# Patient Record
Sex: MALE | Race: WHITE | NOT HISPANIC OR LATINO
[De-identification: names, ages, dates, MRNs, and addresses within clinical notes are randomized per-mention and may not be internally consistent; named-entity substitution may affect disease eponyms.]

---

## 2017-07-12 ENCOUNTER — APPOINTMENT (OUTPATIENT)
Dept: ORTHOPEDIC SURGERY | Facility: CLINIC | Age: 52
End: 2017-07-12

## 2017-07-12 DIAGNOSIS — Z86.79 PERSONAL HISTORY OF OTHER DISEASES OF THE CIRCULATORY SYSTEM: ICD-10-CM

## 2017-07-12 DIAGNOSIS — Z78.9 OTHER SPECIFIED HEALTH STATUS: ICD-10-CM

## 2017-07-12 DIAGNOSIS — Z82.3 FAMILY HISTORY OF STROKE: ICD-10-CM

## 2017-07-12 PROBLEM — Z00.00 ENCOUNTER FOR PREVENTIVE HEALTH EXAMINATION: Status: ACTIVE | Noted: 2017-07-12

## 2017-07-12 RX ORDER — HYDROCHLOROTHIAZIDE 12.5 MG/1
TABLET ORAL
Refills: 0 | Status: ACTIVE | COMMUNITY

## 2017-07-12 RX ORDER — ZOLPIDEM TARTRATE 10 MG/1
10 TABLET ORAL
Refills: 0 | Status: ACTIVE | COMMUNITY

## 2017-07-12 RX ORDER — AMLODIPINE BESYLATE 5 MG/1
5 TABLET ORAL
Refills: 0 | Status: ACTIVE | COMMUNITY

## 2017-07-12 RX ORDER — SERTRALINE HYDROCHLORIDE 50 MG/1
50 TABLET, FILM COATED ORAL
Refills: 0 | Status: ACTIVE | COMMUNITY

## 2017-07-13 RX ORDER — CEPHALEXIN 500 MG/1
500 CAPSULE ORAL 4 TIMES DAILY
Qty: 40 | Refills: 0 | Status: ACTIVE | COMMUNITY
Start: 2017-07-13 | End: 1900-01-01

## 2017-07-24 RX ORDER — HYDROCODONE BITARTRATE AND ACETAMINOPHEN 5; 300 MG/1; MG/1
5-300 TABLET ORAL
Qty: 40 | Refills: 0 | Status: ACTIVE | COMMUNITY
Start: 2017-07-24 | End: 1900-01-01

## 2017-07-25 ENCOUNTER — OUTPATIENT (OUTPATIENT)
Dept: OUTPATIENT SERVICES | Facility: HOSPITAL | Age: 52
LOS: 1 days | Discharge: HOME | End: 2017-07-25
Payer: COMMERCIAL

## 2017-07-25 ENCOUNTER — APPOINTMENT (OUTPATIENT)
Dept: ORTHOPEDIC SURGERY | Facility: AMBULATORY SURGERY CENTER | Age: 52
End: 2017-07-25

## 2017-07-25 PROCEDURE — 26356 REPAIR FINGER/HAND TENDON: CPT | Mod: F9
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
## 2017-07-28 DIAGNOSIS — Z88.8 ALLERGY STATUS TO OTHER DRUGS, MEDICAMENTS AND BIOLOGICAL SUBSTANCES STATUS: ICD-10-CM

## 2017-07-28 DIAGNOSIS — I10 ESSENTIAL (PRIMARY) HYPERTENSION: ICD-10-CM

## 2017-07-28 DIAGNOSIS — F32.9 MAJOR DEPRESSIVE DISORDER, SINGLE EPISODE, UNSPECIFIED: ICD-10-CM
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
## 2017-07-28 DIAGNOSIS — S64.496A INJURY OF DIGITAL NERVE OF RIGHT LITTLE FINGER, INITIAL ENCOUNTER: ICD-10-CM

## 2017-08-07 ENCOUNTER — APPOINTMENT (OUTPATIENT)
Dept: ORTHOPEDIC SURGERY | Facility: CLINIC | Age: 52
End: 2017-08-07
Payer: COMMERCIAL

## 2017-08-07 PROCEDURE — 29075 APPL CST ELBW FNGR SHORT ARM: CPT | Mod: 58,RT

## 2017-08-07 PROCEDURE — 99024 POSTOP FOLLOW-UP VISIT: CPT

## 2017-08-28 ENCOUNTER — APPOINTMENT (OUTPATIENT)
Dept: ORTHOPEDIC SURGERY | Facility: CLINIC | Age: 52
End: 2017-08-28
Payer: COMMERCIAL

## 2017-08-28 PROCEDURE — 99024 POSTOP FOLLOW-UP VISIT: CPT

## 2017-09-25 ENCOUNTER — APPOINTMENT (OUTPATIENT)
Dept: ORTHOPEDIC SURGERY | Facility: CLINIC | Age: 52
End: 2017-09-25
Payer: COMMERCIAL

## 2017-09-25 PROCEDURE — 99024 POSTOP FOLLOW-UP VISIT: CPT

## 2017-11-06 ENCOUNTER — APPOINTMENT (OUTPATIENT)
Dept: ORTHOPEDIC SURGERY | Facility: CLINIC | Age: 52
End: 2017-11-06
Payer: COMMERCIAL

## 2017-11-06 VITALS — HEIGHT: 72 IN | WEIGHT: 175 LBS | BODY MASS INDEX: 23.7 KG/M2 | RESPIRATION RATE: 16 BRPM

## 2017-11-06 PROCEDURE — 99024 POSTOP FOLLOW-UP VISIT: CPT

## 2018-01-08 ENCOUNTER — APPOINTMENT (OUTPATIENT)
Dept: ORTHOPEDIC SURGERY | Facility: CLINIC | Age: 53
End: 2018-01-08
Payer: COMMERCIAL

## 2018-01-08 VITALS — WEIGHT: 180 LBS | RESPIRATION RATE: 16 BRPM | HEIGHT: 72 IN | BODY MASS INDEX: 24.38 KG/M2

## 2018-01-08 PROCEDURE — 99214 OFFICE O/P EST MOD 30 MIN: CPT

## 2018-04-30 ENCOUNTER — APPOINTMENT (OUTPATIENT)
Dept: ORTHOPEDIC SURGERY | Facility: CLINIC | Age: 53
End: 2018-04-30

## 2018-05-14 ENCOUNTER — APPOINTMENT (OUTPATIENT)
Dept: ORTHOPEDIC SURGERY | Facility: CLINIC | Age: 53
End: 2018-05-14
Payer: COMMERCIAL

## 2018-05-14 DIAGNOSIS — S64.496A: ICD-10-CM

## 2018-05-14 DIAGNOSIS — S64.40XA INJURY OF DIGITAL NERVE OF UNSPECIFIED FINGER, INITIAL ENCOUNTER: ICD-10-CM

## 2018-05-14 PROCEDURE — 99214 OFFICE O/P EST MOD 30 MIN: CPT

## 2021-03-09 ENCOUNTER — FORM ENCOUNTER (OUTPATIENT)
Age: 56
End: 2021-03-09

## 2021-03-10 ENCOUNTER — TRANSCRIPTION ENCOUNTER (OUTPATIENT)
Age: 56
End: 2021-03-10

## 2021-03-12 ENCOUNTER — APPOINTMENT (OUTPATIENT)
Dept: DISASTER EMERGENCY | Facility: HOSPITAL | Age: 56
End: 2021-03-12

## 2021-03-13 ENCOUNTER — APPOINTMENT (OUTPATIENT)
Dept: DISASTER EMERGENCY | Facility: HOSPITAL | Age: 56
End: 2021-03-13

## 2021-10-23 ENCOUNTER — TRANSCRIPTION ENCOUNTER (OUTPATIENT)
Age: 56
End: 2021-10-23

## 2021-12-16 ENCOUNTER — TRANSCRIPTION ENCOUNTER (OUTPATIENT)
Age: 56
End: 2021-12-16

## 2022-03-10 ENCOUNTER — APPOINTMENT (OUTPATIENT)
Dept: OPHTHALMOLOGY | Facility: CLINIC | Age: 57
End: 2022-03-10
Payer: COMMERCIAL

## 2022-03-10 ENCOUNTER — NON-APPOINTMENT (OUTPATIENT)
Age: 57
End: 2022-03-10

## 2022-03-10 PROCEDURE — 92133 CPTRZD OPH DX IMG PST SGM ON: CPT

## 2022-03-10 PROCEDURE — 76514 ECHO EXAM OF EYE THICKNESS: CPT

## 2022-03-10 PROCEDURE — 92020 GONIOSCOPY: CPT

## 2022-03-10 PROCEDURE — 92002 INTRM OPH EXAM NEW PATIENT: CPT

## 2022-03-10 PROCEDURE — 92083 EXTENDED VISUAL FIELD XM: CPT

## 2022-03-10 PROCEDURE — 76513 OPH US DX ANT SGM US UNI/BI: CPT | Mod: LT

## 2022-04-06 ENCOUNTER — NON-APPOINTMENT (OUTPATIENT)
Age: 57
End: 2022-04-06

## 2022-04-06 ENCOUNTER — APPOINTMENT (OUTPATIENT)
Dept: OPHTHALMOLOGY | Facility: CLINIC | Age: 57
End: 2022-04-06
Payer: COMMERCIAL

## 2022-04-06 PROCEDURE — 92012 INTRM OPH EXAM EST PATIENT: CPT

## 2022-07-27 ENCOUNTER — APPOINTMENT (OUTPATIENT)
Dept: OPHTHALMOLOGY | Facility: CLINIC | Age: 57
End: 2022-07-27

## 2022-07-27 ENCOUNTER — NON-APPOINTMENT (OUTPATIENT)
Age: 57
End: 2022-07-27

## 2022-07-27 PROCEDURE — 76513 OPH US DX ANT SGM US UNI/BI: CPT | Mod: RT

## 2022-07-27 PROCEDURE — 92083 EXTENDED VISUAL FIELD XM: CPT

## 2022-07-27 PROCEDURE — 92014 COMPRE OPH EXAM EST PT 1/>: CPT

## 2022-09-12 ENCOUNTER — NON-APPOINTMENT (OUTPATIENT)
Age: 57
End: 2022-09-12

## 2022-09-12 ENCOUNTER — APPOINTMENT (OUTPATIENT)
Dept: OPHTHALMOLOGY | Facility: CLINIC | Age: 57
End: 2022-09-12

## 2022-09-12 PROCEDURE — 66761 REVISION OF IRIS: CPT | Mod: RT

## 2022-10-10 ENCOUNTER — APPOINTMENT (OUTPATIENT)
Dept: OPHTHALMOLOGY | Facility: CLINIC | Age: 57
End: 2022-10-10

## 2022-10-10 ENCOUNTER — NON-APPOINTMENT (OUTPATIENT)
Age: 57
End: 2022-10-10

## 2022-10-10 PROCEDURE — 66761 REVISION OF IRIS: CPT | Mod: LT

## 2023-03-30 ENCOUNTER — APPOINTMENT (OUTPATIENT)
Dept: OPHTHALMOLOGY | Facility: CLINIC | Age: 58
End: 2023-03-30

## 2023-04-05 ENCOUNTER — APPOINTMENT (OUTPATIENT)
Dept: OPHTHALMOLOGY | Facility: CLINIC | Age: 58
End: 2023-04-05
Payer: COMMERCIAL

## 2023-04-05 ENCOUNTER — NON-APPOINTMENT (OUTPATIENT)
Age: 58
End: 2023-04-05

## 2023-04-05 PROCEDURE — 76513 OPH US DX ANT SGM US UNI/BI: CPT | Mod: LT

## 2023-04-05 PROCEDURE — 92250 FUNDUS PHOTOGRAPHY W/I&R: CPT

## 2023-04-05 PROCEDURE — 92014 COMPRE OPH EXAM EST PT 1/>: CPT

## 2023-11-09 ENCOUNTER — NON-APPOINTMENT (OUTPATIENT)
Age: 58
End: 2023-11-09

## 2023-11-09 ENCOUNTER — APPOINTMENT (OUTPATIENT)
Dept: OPHTHALMOLOGY | Facility: CLINIC | Age: 58
End: 2023-11-09
Payer: COMMERCIAL

## 2023-11-09 PROCEDURE — 92083 EXTENDED VISUAL FIELD XM: CPT

## 2023-11-09 PROCEDURE — 92133 CPTRZD OPH DX IMG PST SGM ON: CPT

## 2023-11-09 PROCEDURE — 76513 OPH US DX ANT SGM US UNI/BI: CPT | Mod: RT

## 2023-11-09 PROCEDURE — 92012 INTRM OPH EXAM EST PATIENT: CPT

## 2023-11-10 ENCOUNTER — NON-APPOINTMENT (OUTPATIENT)
Age: 58
End: 2023-11-10

## 2023-11-10 ENCOUNTER — APPOINTMENT (OUTPATIENT)
Dept: OPHTHALMOLOGY | Facility: CLINIC | Age: 58
End: 2023-11-10
Payer: SELF-PAY

## 2023-11-10 PROCEDURE — 92015 DETERMINE REFRACTIVE STATE: CPT

## 2024-04-22 ENCOUNTER — APPOINTMENT (OUTPATIENT)
Dept: OPHTHALMOLOGY | Facility: CLINIC | Age: 59
End: 2024-04-22
Payer: COMMERCIAL

## 2024-04-22 ENCOUNTER — TRANSCRIPTION ENCOUNTER (OUTPATIENT)
Age: 59
End: 2024-04-22

## 2024-04-22 ENCOUNTER — NON-APPOINTMENT (OUTPATIENT)
Age: 59
End: 2024-04-22

## 2024-04-22 PROCEDURE — 92250 FUNDUS PHOTOGRAPHY W/I&R: CPT

## 2024-04-22 PROCEDURE — 92020 GONIOSCOPY: CPT

## 2024-04-22 PROCEDURE — 92083 EXTENDED VISUAL FIELD XM: CPT

## 2024-04-22 PROCEDURE — 92136 OPHTHALMIC BIOMETRY: CPT

## 2024-04-22 PROCEDURE — 92014 COMPRE OPH EXAM EST PT 1/>: CPT

## 2024-06-20 ENCOUNTER — NON-APPOINTMENT (OUTPATIENT)
Age: 59
End: 2024-06-20

## 2024-06-20 ENCOUNTER — APPOINTMENT (OUTPATIENT)
Dept: OPHTHALMOLOGY | Facility: CLINIC | Age: 59
End: 2024-06-20
Payer: COMMERCIAL

## 2024-06-20 PROCEDURE — 92136 OPHTHALMIC BIOMETRY: CPT

## 2024-06-20 PROCEDURE — 92012 INTRM OPH EXAM EST PATIENT: CPT

## 2024-09-12 ENCOUNTER — APPOINTMENT (OUTPATIENT)
Dept: INTERNAL MEDICINE | Facility: CLINIC | Age: 59
End: 2024-09-12
Payer: COMMERCIAL

## 2024-09-12 ENCOUNTER — NON-APPOINTMENT (OUTPATIENT)
Age: 59
End: 2024-09-12

## 2024-09-12 VITALS
BODY MASS INDEX: 25.6 KG/M2 | HEART RATE: 59 BPM | HEIGHT: 72 IN | RESPIRATION RATE: 16 BRPM | SYSTOLIC BLOOD PRESSURE: 143 MMHG | WEIGHT: 189 LBS | OXYGEN SATURATION: 98 % | DIASTOLIC BLOOD PRESSURE: 87 MMHG | TEMPERATURE: 97.3 F

## 2024-09-12 DIAGNOSIS — I10 ESSENTIAL (PRIMARY) HYPERTENSION: ICD-10-CM

## 2024-09-12 DIAGNOSIS — F41.9 ANXIETY DISORDER, UNSPECIFIED: ICD-10-CM

## 2024-09-12 DIAGNOSIS — E78.5 HYPERLIPIDEMIA, UNSPECIFIED: ICD-10-CM

## 2024-09-12 DIAGNOSIS — Z01.818 ENCOUNTER FOR OTHER PREPROCEDURAL EXAMINATION: ICD-10-CM

## 2024-09-12 PROCEDURE — 36415 COLL VENOUS BLD VENIPUNCTURE: CPT

## 2024-09-12 PROCEDURE — G0403: CPT

## 2024-09-12 PROCEDURE — 99204 OFFICE O/P NEW MOD 45 MIN: CPT

## 2024-09-12 RX ORDER — ATORVASTATIN CALCIUM 20 MG/1
20 TABLET, FILM COATED ORAL
Qty: 1 | Refills: 0 | Status: ACTIVE | COMMUNITY
Start: 2024-09-12

## 2024-09-12 RX ORDER — TRIAMTERENE AND HYDROCHLOROTHIAZIDE 37.5; 25 MG/1; MG/1
37.5-25 CAPSULE ORAL
Qty: 90 | Refills: 1 | Status: ACTIVE | COMMUNITY
Start: 2024-09-12

## 2024-09-13 LAB
ALBUMIN SERPL ELPH-MCNC: 4.7 G/DL
ALP BLD-CCNC: 53 U/L
ALT SERPL-CCNC: 30 U/L
ANION GAP SERPL CALC-SCNC: 12 MMOL/L
AST SERPL-CCNC: 35 U/L
BILIRUB SERPL-MCNC: 0.7 MG/DL
BUN SERPL-MCNC: 20 MG/DL
CALCIUM SERPL-MCNC: 9.3 MG/DL
CHLORIDE SERPL-SCNC: 105 MMOL/L
CO2 SERPL-SCNC: 23 MMOL/L
CREAT SERPL-MCNC: 0.95 MG/DL
EGFR: 93 ML/MIN/1.73M2
ESTIMATED AVERAGE GLUCOSE: 103 MG/DL
GLUCOSE SERPL-MCNC: 108 MG/DL
HBA1C MFR BLD HPLC: 5.2 %
HCT VFR BLD CALC: 42.9 %
HGB BLD-MCNC: 14.9 G/DL
MCHC RBC-ENTMCNC: 31.9 PG
MCHC RBC-ENTMCNC: 34.7 GM/DL
MCV RBC AUTO: 91.9 FL
PLATELET # BLD AUTO: 150 K/UL
POTASSIUM SERPL-SCNC: 4.3 MMOL/L
PROT SERPL-MCNC: 6.7 G/DL
RBC # BLD: 4.67 M/UL
RBC # FLD: 12.9 %
SODIUM SERPL-SCNC: 141 MMOL/L
WBC # FLD AUTO: 5.52 K/UL

## 2024-09-13 NOTE — ASSESSMENT
[Patient Optimized for Surgery] : Patient optimized for surgery [No Further Testing Recommended] : no further testing recommended [FreeTextEntry4] : 58 yrs old M with pmx of HTN, HLD, chronic insomnia comes in for pre-op eval for CE Femto CE Eyhance w CRI + goniotomy OD RCRI 0 points class I risk. Good functional status Pt is low risk for low risk procedure. No interventions needed from medicine at this point of time.

## 2024-09-13 NOTE — HISTORY OF PRESENT ILLNESS
[(Patient denies any chest pain, claudication, dyspnea on exertion, orthopnea, palpitations or syncope)] : Patient denies any chest pain, claudication, dyspnea on exertion, orthopnea, palpitations or syncope [No Pertinent Cardiac History] : no history of aortic stenosis, atrial fibrillation, coronary artery disease, recent myocardial infarction, or implantable device/pacemaker [No Pertinent Pulmonary History] : no history of asthma, COPD, sleep apnea, or smoking [No Adverse Anesthesia Reaction] : no adverse anesthesia reaction in self or family member [Good (7-10 METs)] : Good (7-10 METs) [FreeTextEntry1] : CE Femto CE Eyshi w CRI + goniotomy OD [FreeTextEntry2] : 10/01/2024 [FreeTextEntry4] : 58 yrs old M with pmx of HTN, HLD, chronic insomnia comes in for pre-op eval for CE Femto CE Eyhance w CRI + goniotomy OD No new complains.  Denies any chest pain, cough, fevers, abd pain, vomiting, diarrhea, rash, joint pains, sob, palpitations. Good functional capacity: does cross fit exercise for 1 hour 4 to 5 times a week.  No problems with anesthesia in the past. Denies any blood thinners or herbal meds use.

## 2024-09-27 NOTE — ASU PATIENT PROFILE, ADULT - FALL HARM RISK - UNIVERSAL INTERVENTIONS
Bed in lowest position, wheels locked, appropriate side rails in place/Call bell, personal items and telephone in reach/Instruct patient to call for assistance before getting out of bed or chair/Non-slip footwear when patient is out of bed/[place] to call system/Physically safe environment - no spills, clutter or unnecessary equipment/Purposeful Proactive Rounding/Room/bathroom lighting operational, light cord in reach

## 2024-10-01 ENCOUNTER — APPOINTMENT (OUTPATIENT)
Dept: OPHTHALMOLOGY | Facility: AMBULATORY SURGERY CENTER | Age: 59
End: 2024-10-01
Payer: COMMERCIAL

## 2024-10-01 ENCOUNTER — NON-APPOINTMENT (OUTPATIENT)
Age: 59
End: 2024-10-01

## 2024-10-01 ENCOUNTER — OUTPATIENT (OUTPATIENT)
Dept: OUTPATIENT SERVICES | Facility: HOSPITAL | Age: 59
LOS: 1 days | Discharge: HOME | End: 2024-10-01
Payer: COMMERCIAL

## 2024-10-01 VITALS
HEART RATE: 46 BPM | WEIGHT: 188.72 LBS | OXYGEN SATURATION: 99 % | TEMPERATURE: 98 F | SYSTOLIC BLOOD PRESSURE: 147 MMHG | RESPIRATION RATE: 16 BRPM | HEIGHT: 72 IN | DIASTOLIC BLOOD PRESSURE: 78 MMHG

## 2024-10-01 VITALS
SYSTOLIC BLOOD PRESSURE: 110 MMHG | OXYGEN SATURATION: 96 % | TEMPERATURE: 98 F | DIASTOLIC BLOOD PRESSURE: 63 MMHG | HEART RATE: 43 BPM | RESPIRATION RATE: 16 BRPM

## 2024-10-01 DIAGNOSIS — Z98.890 OTHER SPECIFIED POSTPROCEDURAL STATES: Chronic | ICD-10-CM

## 2024-10-01 PROCEDURE — 6698F: CPT | Mod: RT

## 2024-10-01 PROCEDURE — 66984 XCAPSL CTRC RMVL W/O ECP: CPT | Mod: RT

## 2024-10-01 PROCEDURE — 65820 GONIOTOMY: CPT | Mod: RT

## 2024-10-01 DEVICE — LENS IOL TECNIS EYHANCE DIB00 18.5D
Type: IMPLANTABLE DEVICE | Site: RIGHT | Status: NON-FUNCTIONAL
Removed: 2024-10-01

## 2024-10-01 RX ORDER — ACETAMINOPHEN 325 MG
1000 TABLET ORAL ONCE
Refills: 0 | Status: DISCONTINUED | OUTPATIENT
Start: 2024-10-01 | End: 2024-10-01

## 2024-10-01 RX ORDER — TROPICAMIDE 1 %
1 DROPS OPHTHALMIC (EYE)
Refills: 0 | Status: COMPLETED | OUTPATIENT
Start: 2024-10-01 | End: 2024-10-01

## 2024-10-01 RX ORDER — TETRACAINE HCL 0.5 %
1 DROPS OPHTHALMIC (EYE) ONCE
Refills: 0 | Status: COMPLETED | OUTPATIENT
Start: 2024-10-01 | End: 2024-10-01

## 2024-10-01 RX ORDER — SODIUM CHLORIDE IRRIG SOLUTION 0.9 %
500 SOLUTION, IRRIGATION IRRIGATION
Refills: 0 | Status: DISCONTINUED | OUTPATIENT
Start: 2024-10-01 | End: 2024-10-01

## 2024-10-01 RX ORDER — PHENYLEPHRINE HYDROCHLORIDE 100 MG/ML
1 SOLUTION/ DROPS OPHTHALMIC
Refills: 0 | Status: COMPLETED | OUTPATIENT
Start: 2024-10-01 | End: 2024-10-01

## 2024-10-01 RX ORDER — OFLOXACIN 3 MG/ML
1 SOLUTION/ DROPS OPHTHALMIC
Refills: 0 | Status: COMPLETED | OUTPATIENT
Start: 2024-10-01 | End: 2024-10-01

## 2024-10-01 RX ADMIN — Medication 1 DROP(S): at 09:10

## 2024-10-01 RX ADMIN — OFLOXACIN 1 DROP(S): 3 SOLUTION/ DROPS OPHTHALMIC at 09:15

## 2024-10-01 RX ADMIN — Medication 1 DROP(S): at 09:15

## 2024-10-01 RX ADMIN — OFLOXACIN 1 DROP(S): 3 SOLUTION/ DROPS OPHTHALMIC at 09:10

## 2024-10-01 RX ADMIN — PHENYLEPHRINE HYDROCHLORIDE 1 DROP(S): 100 SOLUTION/ DROPS OPHTHALMIC at 09:10

## 2024-10-01 RX ADMIN — OFLOXACIN 1 DROP(S): 3 SOLUTION/ DROPS OPHTHALMIC at 09:20

## 2024-10-01 RX ADMIN — PHENYLEPHRINE HYDROCHLORIDE 1 DROP(S): 100 SOLUTION/ DROPS OPHTHALMIC at 09:15

## 2024-10-01 RX ADMIN — PHENYLEPHRINE HYDROCHLORIDE 1 DROP(S): 100 SOLUTION/ DROPS OPHTHALMIC at 09:20

## 2024-10-01 RX ADMIN — Medication 1 DROP(S): at 09:20

## 2024-10-01 NOTE — OPERATIVE REPORT - OPERATIVE RPOSRT DETAILS
DATE OF SURGERY:    SURGEON: José Miguel Carrasco MD    ANESTHESIA:  MAC, topical, intracameral    PREOPERATIVE DIAGNOSIS(ES):  Cataract and Astigmatism, Glaucoma, Right eye.    POSTOPERATIVE DIAGNOSIS(ES):  Cataract and Astigmatism, Glaucoma, Right eye.    OPERATIVE PROCEDURE(S):  1. Femtosecond laser assisted laser surgery, right eye  2. Phacoemulsification with insertion of posterior chamber intraocular lens, right eye  3. Goniotomy, right eye  4. FEMTO assisted corneal relaxing incisions, right eye      IMPLANTS:  DIB00 +18.5 diopter lens    COMPLICATIONS:  None.    SPECIMENS:  None.    DESCRIPTION OF PROCEDURE:  The patient was identified in the holding area.  The risks, benefits, and alternatives to surgery were discussed with the patient at length.  Informed consent was obtained.  The right eye was identified and marked.    The patient was brought to the laser room where certain aspects of the procedure were performed with the femtosecond laser.    The patient was then brought to the operating room and placed in the supine position.  The right eye was prepped and draped in the usual sterile fashion for intraocular surgery.  An eyelid speculum was then placed underneath the right eye.    A sideport blade was used to create a paracentesis.  Preservative free 1% lidocaine was injected into the anterior chamber, followed by Viscoat.  A 2.4mm keratome was used to create a temporal clear corneal incision.  A cystotome was used to begin a continuous curvilinear capsulorrhexis, which was finished with Utrata forceps.  Hydrodissection was done with BSS, and the lens was noted to rotate freely in the capsular bag.    Phacoemulsification was accomplished using the divide-and-conquer technique without complications.  Residual cortical material was removed using single handpiece irrigation and aspiration.  Lidocaine 1% was injected into the anterior chamber.  Healon was then injected into the capsular bag.  The DIB00 +18.5 diopter lens was implanted into the capsular bag and rotated into proper position using a Kuglen hook.  Irrigation and aspiration was used to remove any residual cortical material.  The lens was centered, and the anterior chamber was deep.    Next, the patient's head and microscope were adjusted to visualize the nasal angle.  Healon was placed on the cornea as coupling agent for the gonioprism lens.  The MVR blade was advanced through the main wound towards the nasal angle.  The blade was used to create a full thickness incision in the trabecular meshwork.  Subsequent passes of the blade were made from untreated to treated area.  The nasal angle was treated approximately 90 degrees.  Mild bleeding was noted.  Irrigation and aspiration was used to remove any anterior chamber blood and residual viscoelastic.   All wounds were hydrated and found to be watertight.  Intraocular pressure at the end of procedure was mid-teens.     Topical antibiotics and steroids were applied to the surface of the right eye.  The eyelid speculum was removed.  Maxitrol ointment was applied to the surface of the right eye, which was then patched and shielded.  The patient tolerated the procedure well and was brought to the postoperative care unit in stable condition.

## 2024-10-01 NOTE — PRE-ANESTHESIA EVALUATION ADULT - NSANTHOSAYNRD_GEN_A_CORE
No. SHERRELL screening performed.  STOP BANG Legend: 0-2 = LOW Risk; 3-4 = INTERMEDIATE Risk; 5-8 = HIGH Risk

## 2024-10-02 ENCOUNTER — NON-APPOINTMENT (OUTPATIENT)
Age: 59
End: 2024-10-02

## 2024-10-02 ENCOUNTER — APPOINTMENT (OUTPATIENT)
Dept: OPHTHALMOLOGY | Facility: CLINIC | Age: 59
End: 2024-10-02
Payer: COMMERCIAL

## 2024-10-02 PROCEDURE — 76512 OPH US DX B-SCAN: CPT | Mod: RT

## 2024-10-02 PROCEDURE — 99024 POSTOP FOLLOW-UP VISIT: CPT

## 2024-10-03 ENCOUNTER — APPOINTMENT (OUTPATIENT)
Dept: OPHTHALMOLOGY | Facility: CLINIC | Age: 59
End: 2024-10-03
Payer: COMMERCIAL

## 2024-10-03 ENCOUNTER — OUTPATIENT (OUTPATIENT)
Dept: OUTPATIENT SERVICES | Facility: HOSPITAL | Age: 59
LOS: 1 days | Discharge: HOME | End: 2024-10-03

## 2024-10-03 ENCOUNTER — NON-APPOINTMENT (OUTPATIENT)
Age: 59
End: 2024-10-03

## 2024-10-03 ENCOUNTER — APPOINTMENT (OUTPATIENT)
Dept: OPHTHALMOLOGY | Facility: AMBULATORY SURGERY CENTER | Age: 59
End: 2024-10-03

## 2024-10-03 ENCOUNTER — TRANSCRIPTION ENCOUNTER (OUTPATIENT)
Age: 59
End: 2024-10-03

## 2024-10-03 VITALS
RESPIRATION RATE: 16 BRPM | WEIGHT: 188.72 LBS | TEMPERATURE: 98 F | SYSTOLIC BLOOD PRESSURE: 140 MMHG | OXYGEN SATURATION: 100 % | HEIGHT: 72 IN | DIASTOLIC BLOOD PRESSURE: 81 MMHG | HEART RATE: 45 BPM

## 2024-10-03 VITALS
RESPIRATION RATE: 16 BRPM | OXYGEN SATURATION: 99 % | SYSTOLIC BLOOD PRESSURE: 117 MMHG | DIASTOLIC BLOOD PRESSURE: 69 MMHG | HEART RATE: 49 BPM | TEMPERATURE: 98 F

## 2024-10-03 DIAGNOSIS — Z98.890 OTHER SPECIFIED POSTPROCEDURAL STATES: Chronic | ICD-10-CM

## 2024-10-03 DIAGNOSIS — Z98.49 CATARACT EXTRACTION STATUS, UNSPECIFIED EYE: Chronic | ICD-10-CM

## 2024-10-03 PROBLEM — F41.9 ANXIETY DISORDER, UNSPECIFIED: Chronic | Status: ACTIVE | Noted: 2024-09-27

## 2024-10-03 PROBLEM — I10 ESSENTIAL (PRIMARY) HYPERTENSION: Chronic | Status: ACTIVE | Noted: 2024-09-27

## 2024-10-03 PROBLEM — E78.00 PURE HYPERCHOLESTEROLEMIA, UNSPECIFIED: Chronic | Status: ACTIVE | Noted: 2024-09-27

## 2024-10-03 PROCEDURE — 65815 DRAINAGE OF EYE: CPT | Mod: 78,RT

## 2024-10-03 PROCEDURE — 99024 POSTOP FOLLOW-UP VISIT: CPT

## 2024-10-03 DEVICE — SYS OMNI SURGICAL: Type: IMPLANTABLE DEVICE | Site: RIGHT | Status: FUNCTIONAL

## 2024-10-03 RX ORDER — HYDROCHLOROTHIAZIDE 50 MG/1
0 TABLET ORAL
Refills: 0 | DISCHARGE

## 2024-10-03 RX ORDER — SERTRALINE HYDROCHLORIDE 100 MG/1
1 TABLET, FILM COATED ORAL
Refills: 0 | DISCHARGE

## 2024-10-03 RX ORDER — AMLODIPINE BESYLATE 5 MG
1 TABLET ORAL
Refills: 0 | DISCHARGE

## 2024-10-03 RX ORDER — ATORVASTATIN CALCIUM 10 MG/1
0 TABLET, FILM COATED ORAL
Refills: 0 | DISCHARGE

## 2024-10-03 NOTE — PRE-ANESTHESIA EVALUATION ADULT - NSANTHADDINFOFT_GEN_ALL_CORE
For conscious sedation Pt. accepts awareness to sight, sound, touch, pressure and memory of procedure.  For GA Pt accepts all anesthesia risks .IBNLT: Dental, Pharyngeal, Laryngeal, Tracheal Trauma, Sore Throat, Hoarseness, Recurrent Laryngeal Nerve Dysfunction, Upper and Lower Extremity Paresthesias, Nausea and Vomiting, Potential exacerbation of asthma and SHERRELL.

## 2024-10-03 NOTE — ASU PATIENT PROFILE, ADULT - NSICDXPASTSURGICALHX_GEN_ALL_CORE_FT
PAST SURGICAL HISTORY:  H/O cataract extraction     H/O knee surgery bilateral    History of hand surgery right

## 2024-10-03 NOTE — ASU DISCHARGE PLAN (ADULT/PEDIATRIC) - NS MD DC FALL RISK RISK
For information on Fall & Injury Prevention, visit: [email or web address] OR  [email or web address] OR  https://www.cdc.gov/steadi/patient.html

## 2024-10-04 ENCOUNTER — APPOINTMENT (OUTPATIENT)
Dept: OPHTHALMOLOGY | Facility: CLINIC | Age: 59
End: 2024-10-04
Payer: COMMERCIAL

## 2024-10-04 ENCOUNTER — NON-APPOINTMENT (OUTPATIENT)
Age: 59
End: 2024-10-04

## 2024-10-04 PROCEDURE — 99024 POSTOP FOLLOW-UP VISIT: CPT

## 2024-10-07 ENCOUNTER — APPOINTMENT (OUTPATIENT)
Dept: OPHTHALMOLOGY | Facility: CLINIC | Age: 59
End: 2024-10-07
Payer: COMMERCIAL

## 2024-10-07 ENCOUNTER — NON-APPOINTMENT (OUTPATIENT)
Age: 59
End: 2024-10-07

## 2024-10-07 PROCEDURE — 76512 OPH US DX B-SCAN: CPT | Mod: RT

## 2024-10-07 PROCEDURE — 99024 POSTOP FOLLOW-UP VISIT: CPT

## 2024-10-07 NOTE — OPERATIVE REPORT - OPERATIVE RPOSRT DETAILS
DATE OF SURGERY:    SURGEON: José Miguel Carrasco MD    ANESTHESIA:  MAC, topical, intracameral    PREOPERATIVE DIAGNOSIS(ES):  Hyphema, Right eye.    POSTOPERATIVE DIAGNOSIS(ES):  Hyphema, Right eye.    OPERATIVE PROCEDURE(S):  1.AC Washout, Right eye    COMPLICATIONS:  None.    SPECIMENS:  None.    DESCRIPTION OF PROCEDURE:  The patient was identified in the holding area.  The risks, benefits, and alternatives to surgery were discussed with the patient at length.  Informed consent was obtained.  The right eye was identified and marked.    The patient was brought to the operating room and placed in the supine position.  The right eye was prepped and draped in the usual sterile fashion for intraocular surgery.  An eyelid speculum was then placed underneath the right eye.    Under the microscopy hyphema with a large clot was observed in front of the pupil. A sideport blade was used to create a paracentesis.  Preservative free 1% lidocaine was injected into the anterior chamber, healon was the used to separate the clot from the iris.  The temporal clear corneal incision was then reopen and using I-A canula the clot and hyphema was washed out without any complication. Incisions were hydrated and found watertight, AC was deep and IOP in the high teens. PCIOL in stable position. No complications were noted.    Topical antibiotics and steroids were applied to the surface of the right eye.  The eyelid speculum was removed.  Maxitrol ointment was applied to the surface of the right eye, which was then patched and shielded.  The patient tolerated the procedure well and was brought to the postoperative care unit in stable condition.

## 2024-10-10 ENCOUNTER — APPOINTMENT (OUTPATIENT)
Dept: OPHTHALMOLOGY | Facility: CLINIC | Age: 59
End: 2024-10-10
Payer: COMMERCIAL

## 2024-10-10 ENCOUNTER — NON-APPOINTMENT (OUTPATIENT)
Age: 59
End: 2024-10-10

## 2024-10-10 PROCEDURE — 99024 POSTOP FOLLOW-UP VISIT: CPT

## 2024-10-14 ENCOUNTER — APPOINTMENT (OUTPATIENT)
Dept: OPHTHALMOLOGY | Facility: CLINIC | Age: 59
End: 2024-10-14
Payer: COMMERCIAL

## 2024-10-14 ENCOUNTER — NON-APPOINTMENT (OUTPATIENT)
Age: 59
End: 2024-10-14

## 2024-10-14 ENCOUNTER — APPOINTMENT (OUTPATIENT)
Dept: OPHTHALMOLOGY | Facility: CLINIC | Age: 59
End: 2024-10-14

## 2024-10-14 PROCEDURE — 92134 CPTRZ OPH DX IMG PST SGM RTA: CPT

## 2024-10-14 PROCEDURE — 92014 COMPRE OPH EXAM EST PT 1/>: CPT | Mod: 24

## 2024-10-14 PROCEDURE — 76512 OPH US DX B-SCAN: CPT | Mod: RT

## 2024-10-14 PROCEDURE — 99024 POSTOP FOLLOW-UP VISIT: CPT

## 2024-10-14 PROCEDURE — 92201 OPSCPY EXTND RTA DRAW UNI/BI: CPT

## 2024-10-17 ENCOUNTER — APPOINTMENT (OUTPATIENT)
Dept: OPHTHALMOLOGY | Facility: CLINIC | Age: 59
End: 2024-10-17
Payer: COMMERCIAL

## 2024-10-17 ENCOUNTER — NON-APPOINTMENT (OUTPATIENT)
Age: 59
End: 2024-10-17

## 2024-10-17 PROCEDURE — 99024 POSTOP FOLLOW-UP VISIT: CPT

## 2024-10-24 ENCOUNTER — APPOINTMENT (OUTPATIENT)
Dept: OPHTHALMOLOGY | Facility: CLINIC | Age: 59
End: 2024-10-24
Payer: COMMERCIAL

## 2024-10-24 ENCOUNTER — NON-APPOINTMENT (OUTPATIENT)
Age: 59
End: 2024-10-24

## 2024-10-24 PROCEDURE — 99024 POSTOP FOLLOW-UP VISIT: CPT

## 2024-11-12 ENCOUNTER — NON-APPOINTMENT (OUTPATIENT)
Age: 59
End: 2024-11-12

## 2024-11-12 ENCOUNTER — APPOINTMENT (OUTPATIENT)
Dept: OPHTHALMOLOGY | Facility: CLINIC | Age: 59
End: 2024-11-12
Payer: SELF-PAY

## 2024-11-12 PROCEDURE — 92015 DETERMINE REFRACTIVE STATE: CPT

## 2024-11-12 PROCEDURE — 99199 UNLISTED SPECIAL SVC PX/RPRT: CPT | Mod: NC

## 2024-11-21 ENCOUNTER — APPOINTMENT (OUTPATIENT)
Dept: OPHTHALMOLOGY | Facility: CLINIC | Age: 59
End: 2024-11-21
Payer: COMMERCIAL

## 2024-11-21 ENCOUNTER — NON-APPOINTMENT (OUTPATIENT)
Age: 59
End: 2024-11-21

## 2024-11-21 PROCEDURE — 99024 POSTOP FOLLOW-UP VISIT: CPT

## 2025-03-17 ENCOUNTER — APPOINTMENT (OUTPATIENT)
Facility: CLINIC | Age: 60
End: 2025-03-17
Payer: COMMERCIAL

## 2025-03-17 ENCOUNTER — NON-APPOINTMENT (OUTPATIENT)
Age: 60
End: 2025-03-17

## 2025-03-17 PROCEDURE — 92083 EXTENDED VISUAL FIELD XM: CPT

## 2025-03-17 PROCEDURE — 92133 CPTRZD OPH DX IMG PST SGM ON: CPT

## 2025-03-17 PROCEDURE — 92012 INTRM OPH EXAM EST PATIENT: CPT

## 2025-04-15 ENCOUNTER — APPOINTMENT (OUTPATIENT)
Facility: CLINIC | Age: 60
End: 2025-04-15

## 2025-07-21 ENCOUNTER — APPOINTMENT (OUTPATIENT)
Facility: CLINIC | Age: 60
End: 2025-07-21
Payer: COMMERCIAL

## 2025-07-21 ENCOUNTER — NON-APPOINTMENT (OUTPATIENT)
Age: 60
End: 2025-07-21

## 2025-07-21 PROCEDURE — 92012 INTRM OPH EXAM EST PATIENT: CPT

## 2025-07-21 PROCEDURE — 92083 EXTENDED VISUAL FIELD XM: CPT

## (undated) DEVICE — WARMING BLANKET UPPER ADULT

## (undated) DEVICE — TRANSFORMER INTREPID I/A 0.3MM

## (undated) DEVICE — KNIFE ALCON PARACENTESIS CLEARCUT SIDEPORT 1MM (YELLOW)

## (undated) DEVICE — CANNULA FLEX TIP 45 DEG 27GAX22MM

## (undated) DEVICE — GONIO LENS IPRISM S 1.1X LEFT HAND

## (undated) DEVICE — CARTRIDGE PLATINUM

## (undated) DEVICE — CAPSULE GUARD I/A

## (undated) DEVICE — PACK CENTURION 2.4MM

## (undated) DEVICE — KNIFE ALCON MVR V-LANCE 20G (WHITE)

## (undated) DEVICE — CANNULA IRR ANT CHAMBER 30G

## (undated) DEVICE — KNIFE ALCON MVR V-LANCE 23G (BLACK)

## (undated) DEVICE — SPEAR SURG EYE WECK-CELL CELOS

## (undated) DEVICE — DRAPE MICROSCOPE KNOB COVER SMALL (2 PCS)

## (undated) DEVICE — SOL IRR BAG BSS 500ML

## (undated) DEVICE — NUCLEUS HYDRODISSECTOR PEARCE ANGLED 25G X 22MM

## (undated) DEVICE — KIT CENTURION ANTERIOR

## (undated) DEVICE — GLV 7.5 PROTEXIS (WHITE)

## (undated) DEVICE — APPLICATOR COTTON TIP 3" STERILE

## (undated) DEVICE — KNIFE ALCON SLIT INTREPID CLEAR-CUT SAFETY 2.4MM

## (undated) DEVICE — FORCEP GRIESHABER 23G DISP

## (undated) DEVICE — PACK ANTERIOR SEGMENT

## (undated) DEVICE — VENODYNE/SCD SLEEVE CALF MEDIUM

## (undated) DEVICE — CANNULA IRR ALCON ANTERIOR CHAMBER 30G

## (undated) DEVICE — GOWN ROYAL SILK XL

## (undated) DEVICE — TIP OZIL 12 DEGREE MINI FLARE